# Patient Record
Sex: FEMALE | Race: WHITE | Employment: PART TIME | URBAN - NONMETROPOLITAN AREA
[De-identification: names, ages, dates, MRNs, and addresses within clinical notes are randomized per-mention and may not be internally consistent; named-entity substitution may affect disease eponyms.]

---

## 2020-03-07 ENCOUNTER — HOSPITAL ENCOUNTER (EMERGENCY)
Age: 25
Discharge: HOME | End: 2020-03-07
Payer: COMMERCIAL

## 2020-03-07 ENCOUNTER — APPOINTMENT (OUTPATIENT)
Dept: GENERAL RADIOLOGY | Age: 25
End: 2020-03-07
Payer: COMMERCIAL

## 2020-03-07 PROCEDURE — 99281 EMR DPT VST MAYX REQ PHY/QHP: CPT

## 2020-03-07 PROCEDURE — 99282 EMERGENCY DEPT VISIT SF MDM: CPT

## 2020-03-07 PROCEDURE — 73080 X-RAY EXAM OF ELBOW: CPT

## 2020-03-07 NOTE — ED PROVIDER NOTES
See downtime paper chart      Medardo Shi PA-C  03/07/20 [address] [facility] Burton Choi PA-C  03/07/20 [id number]

## 2022-01-20 ENCOUNTER — NURSE ONLY (OUTPATIENT)
Dept: LAB | Age: 27
End: 2022-01-20

## 2022-11-08 ENCOUNTER — NURSE ONLY (OUTPATIENT)
Dept: LAB | Age: 27
End: 2022-11-08

## 2022-11-25 LAB — CYTOLOGY THIN PREP PAP: NORMAL

## 2023-08-21 ENCOUNTER — OFFICE VISIT (OUTPATIENT)
Dept: FAMILY MEDICINE CLINIC | Age: 28
End: 2023-08-21
Payer: COMMERCIAL

## 2023-08-21 VITALS
RESPIRATION RATE: 16 BRPM | TEMPERATURE: 97.7 F | HEIGHT: 69 IN | BODY MASS INDEX: 23.88 KG/M2 | DIASTOLIC BLOOD PRESSURE: 72 MMHG | SYSTOLIC BLOOD PRESSURE: 118 MMHG | OXYGEN SATURATION: 99 % | HEART RATE: 88 BPM | WEIGHT: 161.2 LBS

## 2023-08-21 DIAGNOSIS — Z13.31 DEPRESSION SCREENING NEGATIVE: ICD-10-CM

## 2023-08-21 DIAGNOSIS — L73.9 FOLLICULITIS: ICD-10-CM

## 2023-08-21 DIAGNOSIS — H61.23 BILATERAL IMPACTED CERUMEN: ICD-10-CM

## 2023-08-21 DIAGNOSIS — Z00.00 ENCOUNTER FOR MEDICAL EXAMINATION TO ESTABLISH CARE: Primary | ICD-10-CM

## 2023-08-21 DIAGNOSIS — B08.1 MOLLUSCUM CONTAGIOSUM: ICD-10-CM

## 2023-08-21 PROCEDURE — 69209 REMOVE IMPACTED EAR WAX UNI: CPT | Performed by: NURSE PRACTITIONER

## 2023-08-21 PROCEDURE — 99204 OFFICE O/P NEW MOD 45 MIN: CPT | Performed by: NURSE PRACTITIONER

## 2023-08-21 RX ORDER — SULFAMETHOXAZOLE AND TRIMETHOPRIM 800; 160 MG/1; MG/1
1 TABLET ORAL 2 TIMES DAILY
Qty: 20 TABLET | Refills: 0 | Status: SHIPPED | OUTPATIENT
Start: 2023-08-21 | End: 2023-08-31

## 2023-08-21 RX ORDER — PODOFILOX 5 MG/ML
SOLUTION TOPICAL
Qty: 3.5 ML | Refills: 2 | Status: SHIPPED | OUTPATIENT
Start: 2023-08-21 | End: 2023-08-31

## 2023-08-21 RX ORDER — NORETHINDRONE ACETATE AND ETHINYL ESTRADIOL AND FERROUS FUMARATE 1.5-30(21)
1 KIT ORAL DAILY
COMMUNITY
Start: 2023-07-30

## 2023-08-21 SDOH — ECONOMIC STABILITY: HOUSING INSECURITY
IN THE LAST 12 MONTHS, WAS THERE A TIME WHEN YOU DID NOT HAVE A STEADY PLACE TO SLEEP OR SLEPT IN A SHELTER (INCLUDING NOW)?: NO

## 2023-08-21 SDOH — ECONOMIC STABILITY: FOOD INSECURITY: WITHIN THE PAST 12 MONTHS, THE FOOD YOU BOUGHT JUST DIDN'T LAST AND YOU DIDN'T HAVE MONEY TO GET MORE.: NEVER TRUE

## 2023-08-21 SDOH — ECONOMIC STABILITY: FOOD INSECURITY: WITHIN THE PAST 12 MONTHS, YOU WORRIED THAT YOUR FOOD WOULD RUN OUT BEFORE YOU GOT MONEY TO BUY MORE.: NEVER TRUE

## 2023-08-21 SDOH — ECONOMIC STABILITY: INCOME INSECURITY: HOW HARD IS IT FOR YOU TO PAY FOR THE VERY BASICS LIKE FOOD, HOUSING, MEDICAL CARE, AND HEATING?: NOT HARD AT ALL

## 2023-08-21 ASSESSMENT — ENCOUNTER SYMPTOMS
RESPIRATORY NEGATIVE: 1
COLOR CHANGE: 1
GASTROINTESTINAL NEGATIVE: 1

## 2023-08-21 ASSESSMENT — PATIENT HEALTH QUESTIONNAIRE - PHQ9
2. FEELING DOWN, DEPRESSED OR HOPELESS: 0
1. LITTLE INTEREST OR PLEASURE IN DOING THINGS: 0
SUM OF ALL RESPONSES TO PHQ QUESTIONS 1-9: 0
SUM OF ALL RESPONSES TO PHQ9 QUESTIONS 1 & 2: 0

## 2023-08-22 ENCOUNTER — NURSE ONLY (OUTPATIENT)
Dept: LAB | Age: 28
End: 2023-08-22

## 2023-08-22 DIAGNOSIS — Z00.00 ENCOUNTER FOR MEDICAL EXAMINATION TO ESTABLISH CARE: ICD-10-CM

## 2023-08-22 DIAGNOSIS — Z13.31 DEPRESSION SCREENING NEGATIVE: ICD-10-CM

## 2023-08-22 DIAGNOSIS — L73.9 FOLLICULITIS: ICD-10-CM

## 2023-08-22 DIAGNOSIS — B08.1 MOLLUSCUM CONTAGIOSUM: ICD-10-CM

## 2023-08-22 LAB
ANION GAP SERPL CALC-SCNC: 9 MEQ/L (ref 8–16)
BASOPHILS ABSOLUTE: 0 THOU/MM3 (ref 0–0.1)
BASOPHILS NFR BLD AUTO: 0.9 %
BUN SERPL-MCNC: 10 MG/DL (ref 7–22)
CALCIUM SERPL-MCNC: 9.5 MG/DL (ref 8.5–10.5)
CHLORIDE SERPL-SCNC: 105 MEQ/L (ref 98–111)
CO2 SERPL-SCNC: 25 MEQ/L (ref 23–33)
CREAT SERPL-MCNC: 0.7 MG/DL (ref 0.4–1.2)
DEPRECATED RDW RBC AUTO: 41.1 FL (ref 35–45)
EOSINOPHIL NFR BLD AUTO: 1.1 %
EOSINOPHILS ABSOLUTE: 0 THOU/MM3 (ref 0–0.4)
ERYTHROCYTE [DISTWIDTH] IN BLOOD BY AUTOMATED COUNT: 12.4 % (ref 11.5–14.5)
GFR SERPL CREATININE-BSD FRML MDRD: > 60 ML/MIN/1.73M2
GLUCOSE SERPL-MCNC: 89 MG/DL (ref 70–108)
HCT VFR BLD AUTO: 44.6 % (ref 37–47)
HGB BLD-MCNC: 14.6 GM/DL (ref 12–16)
IMM GRANULOCYTES # BLD AUTO: 0.01 THOU/MM3 (ref 0–0.07)
IMM GRANULOCYTES NFR BLD AUTO: 0.2 %
LYMPHOCYTES ABSOLUTE: 1.6 THOU/MM3 (ref 1–4.8)
LYMPHOCYTES NFR BLD AUTO: 35.7 %
MCH RBC QN AUTO: 29.7 PG (ref 26–33)
MCHC RBC AUTO-ENTMCNC: 32.7 GM/DL (ref 32.2–35.5)
MCV RBC AUTO: 90.7 FL (ref 81–99)
MONOCYTES ABSOLUTE: 0.3 THOU/MM3 (ref 0.4–1.3)
MONOCYTES NFR BLD AUTO: 7.4 %
NEUTROPHILS NFR BLD AUTO: 54.7 %
NRBC BLD AUTO-RTO: 0 /100 WBC
PLATELET # BLD AUTO: 234 THOU/MM3 (ref 130–400)
PMV BLD AUTO: 11.1 FL (ref 9.4–12.4)
POTASSIUM SERPL-SCNC: 4 MEQ/L (ref 3.5–5.2)
RBC # BLD AUTO: 4.92 MILL/MM3 (ref 4.2–5.4)
SEGMENTED NEUTROPHILS ABSOLUTE COUNT: 2.4 THOU/MM3 (ref 1.8–7.7)
SODIUM SERPL-SCNC: 139 MEQ/L (ref 135–145)
TSH SERPL DL<=0.005 MIU/L-ACNC: 1.83 UIU/ML (ref 0.4–4.2)
WBC # BLD AUTO: 4.4 THOU/MM3 (ref 4.8–10.8)

## 2023-08-23 ENCOUNTER — TELEPHONE (OUTPATIENT)
Dept: FAMILY MEDICINE CLINIC | Age: 28
End: 2023-08-23

## 2023-08-23 NOTE — TELEPHONE ENCOUNTER
Called patient and informed of lab results, patient verbalized understanding. No questions or concerns at this time.

## 2023-08-23 NOTE — TELEPHONE ENCOUNTER
----- Message from JOSE Parish CNP sent at 8/22/2023  4:58 PM EDT -----  Let pt know her labs look good, her white count is a little low at 4.4 normal range is 4.8-10.8, it could be a little low due to a recent viral infection, the rest of the CBC Is normal so not concerned with the white count. Her tsh is normal. So no concerning findings,   lket me know if she has any questions.

## 2023-11-02 ENCOUNTER — OFFICE VISIT (OUTPATIENT)
Dept: FAMILY MEDICINE CLINIC | Age: 28
End: 2023-11-02
Payer: COMMERCIAL

## 2023-11-02 VITALS
RESPIRATION RATE: 14 BRPM | TEMPERATURE: 97.9 F | BODY MASS INDEX: 24.79 KG/M2 | HEART RATE: 88 BPM | SYSTOLIC BLOOD PRESSURE: 114 MMHG | WEIGHT: 167.4 LBS | DIASTOLIC BLOOD PRESSURE: 80 MMHG | OXYGEN SATURATION: 98 % | HEIGHT: 69 IN

## 2023-11-02 DIAGNOSIS — R09.81 HEAD CONGESTION: ICD-10-CM

## 2023-11-02 DIAGNOSIS — H65.01 NON-RECURRENT ACUTE SEROUS OTITIS MEDIA OF RIGHT EAR: ICD-10-CM

## 2023-11-02 DIAGNOSIS — H91.93 BILATERAL HEARING LOSS, UNSPECIFIED HEARING LOSS TYPE: ICD-10-CM

## 2023-11-02 DIAGNOSIS — H61.23 BILATERAL IMPACTED CERUMEN: ICD-10-CM

## 2023-11-02 DIAGNOSIS — H92.01 ACUTE OTALGIA, RIGHT: Primary | ICD-10-CM

## 2023-11-02 LAB
Lab: 1234
QC PASS/FAIL: NORMAL
SARS-COV-2 RDRP RESP QL NAA+PROBE: NEGATIVE

## 2023-11-02 PROCEDURE — 99214 OFFICE O/P EST MOD 30 MIN: CPT | Performed by: NURSE PRACTITIONER

## 2023-11-02 PROCEDURE — 87635 SARS-COV-2 COVID-19 AMP PRB: CPT | Performed by: NURSE PRACTITIONER

## 2023-11-02 RX ORDER — AMOXICILLIN 500 MG/1
500 CAPSULE ORAL 3 TIMES DAILY
Qty: 30 CAPSULE | Refills: 0 | Status: SHIPPED | OUTPATIENT
Start: 2023-11-02 | End: 2023-11-12

## 2023-11-02 NOTE — PROGRESS NOTES
SUBJECTIVE:  Marc Saleh is a 25 y.o. y/o female that presents with Otalgia (Tuesday night right ear. Left ear feels clogged. Pt thinks she has an ear infection)  . HPI:      Symptoms have been present for 3 day(s). Symptoms are unchanged since they initially started. Fever? No  Runny nose or congestion? Yes   Cough? Yes  Sore throat? No  Headache, fatigue, joint pains, muscle aches? No  Shortness of breath/Wheezing? No  Nausea/Vomiting/Diarrhea? No  Double Sickening? No  Sick contacts? Mother was sick last week, having right ear pain severe took nyquil helped a little bit no tylenol or motrin  Known COVID exposures of RFs? No  Smoker? No  Preexisting Respiratory conditions? No    Patient has tried nyquil and dayquil with out improvement. Ear Complaint    HPI:  Symptoms have been present for 3 day(s). Inciting incident or history of trauma? no  Decreased hearing? Yes  Ear tenderness? No  Ear drainage? No  Feeling of fullness? Yes  Radiation of the pain? No  Dizziness or pre-syncope? No  Affected by position or head movment? No  Sore throat, rhinorrhea or sinus congestion? Yes  Hx of Bruxism? No  Treatment tried and response - as above      History reviewed. No pertinent past medical history.     Social History     Socioeconomic History    Marital status: Single     Spouse name: Not on file    Number of children: Not on file    Years of education: Not on file    Highest education level: Not on file   Occupational History    Not on file   Tobacco Use    Smoking status: Never    Smokeless tobacco: Never   Substance and Sexual Activity    Alcohol use: No    Drug use: No    Sexual activity: Not on file   Other Topics Concern    Not on file   Social History Narrative    Not on file     Social Determinants of Health     Financial Resource Strain: Low Risk  (8/21/2023)    Overall Financial Resource Strain (CARDIA)     Difficulty of Paying Living Expenses: Not hard at all   Food

## 2023-11-09 ENCOUNTER — NURSE ONLY (OUTPATIENT)
Dept: LAB | Age: 28
End: 2023-11-09

## 2023-11-20 ENCOUNTER — OFFICE VISIT (OUTPATIENT)
Dept: ENT CLINIC | Age: 28
End: 2023-11-20
Payer: COMMERCIAL

## 2023-11-20 VITALS
BODY MASS INDEX: 24.57 KG/M2 | HEART RATE: 91 BPM | OXYGEN SATURATION: 99 % | WEIGHT: 165.9 LBS | TEMPERATURE: 97.6 F | SYSTOLIC BLOOD PRESSURE: 120 MMHG | HEIGHT: 69 IN | DIASTOLIC BLOOD PRESSURE: 78 MMHG | RESPIRATION RATE: 18 BRPM

## 2023-11-20 DIAGNOSIS — H61.23 HEARING LOSS OF BOTH EARS DUE TO CERUMEN IMPACTION: Primary | ICD-10-CM

## 2023-11-20 LAB — CYTOLOGY THIN PREP PAP: NORMAL

## 2023-11-20 PROCEDURE — 69210 REMOVE IMPACTED EAR WAX UNI: CPT | Performed by: PHYSICIAN ASSISTANT

## 2023-11-20 PROCEDURE — 99202 OFFICE O/P NEW SF 15 MIN: CPT | Performed by: PHYSICIAN ASSISTANT

## 2023-11-20 NOTE — PROGRESS NOTES
with exam.   Cranial nerve II-XII grossly intact. Skin:  Skin is warm. No erythema. Psychiatric:  Normal mood and affect. Behavior is normal.     Data:    All of the past medical history, past surgical history, family history, social history, allergies and current medications were reviewed. This includes notes from referring provider(s) and associated labs/imaging. Assessment/Plan:      ICD-10-CM    1. Hearing loss of both ears due to cerumen impaction  H61.23            Extensive bilateral cerumen impactions removed as described above. She reports immediate improvement in her hearing bilaterally with cerumen removal. She overall tolerated the procedure well without immediate evidence of complication  No visible middle ear pathology noted  Follow up PRN for muffled hearing/recurrence of cerumen impactions or other ENT concerns. She expresses understanding of the plan and thanked me.     (Please note that portions of this note may have been completed with a voice recognition program.  Efforts were made to edit the dictation but occasionally words are mis-transcribed.)    Electronically signed by CIARA Jones on 11/20/2023 at 8:51 AM

## 2024-01-14 ENCOUNTER — HOSPITAL ENCOUNTER (EMERGENCY)
Age: 29
Discharge: HOME | End: 2024-01-14
Payer: COMMERCIAL

## 2024-01-14 VITALS
TEMPERATURE: 101 F | SYSTOLIC BLOOD PRESSURE: 122 MMHG | OXYGEN SATURATION: 98 % | HEART RATE: 110 BPM | DIASTOLIC BLOOD PRESSURE: 84 MMHG | RESPIRATION RATE: 16 BRPM

## 2024-01-14 DIAGNOSIS — J10.1 INFLUENZA B: Primary | ICD-10-CM

## 2024-01-14 LAB
FLUAV AG SPEC QL: NEGATIVE
FLUBV AG SPEC QL: POSITIVE

## 2024-01-14 PROCEDURE — 99203 OFFICE O/P NEW LOW 30 MIN: CPT | Performed by: NURSE PRACTITIONER

## 2024-01-14 PROCEDURE — 87804 INFLUENZA ASSAY W/OPTIC: CPT

## 2024-01-14 PROCEDURE — 99203 OFFICE O/P NEW LOW 30 MIN: CPT

## 2024-01-14 RX ORDER — OSELTAMIVIR PHOSPHATE 75 MG/1
75 CAPSULE ORAL 2 TIMES DAILY
Qty: 10 CAPSULE | Refills: 0 | Status: SHIPPED | OUTPATIENT
Start: 2024-01-14 | End: 2024-01-19

## 2024-01-14 ASSESSMENT — PAIN SCALES - GENERAL: PAINLEVEL_OUTOF10: 5

## 2024-01-14 ASSESSMENT — PAIN - FUNCTIONAL ASSESSMENT: PAIN_FUNCTIONAL_ASSESSMENT: 0-10

## 2024-01-14 ASSESSMENT — PAIN DESCRIPTION - LOCATION: LOCATION: THROAT

## 2024-01-14 NOTE — DISCHARGE INSTR - COC
Continuity of Care Form    Patient Name: Cheyenne Garcia   :  1995  MRN:  [id number]    Admit date:  2024  Discharge date:  ***    Code Status Order: No Order   Advance Directives:     Admitting Physician:  No admitting provider for patient encounter.  PCP: Eric Yo APRN - CNP    Discharging Nurse: ***  Discharging Hospital Unit/Room#:   Discharging Unit Phone Number: ***    Emergency Contact:   Extended Emergency Contact Information  Primary Emergency Contact: Laverne Garcia  Address: [address] [place] [address]  Home Phone: [phone]  Relation: Parent    Past Surgical History:  Past Surgical History:   Procedure Laterality Date    MANDIBLE RECONSTRUCTION         Immunization History:   Immunization History   Administered Date(s) Administered    TDaP, ADACEL (age 10y-64y), BOOSTRIX (age 10y+), IM, 0.5mL 2020    Td, unspecified formulation 2012       Active Problems:  There is no problem list on file for this patient.      Isolation/Infection:   Isolation            No Isolation          Patient Infection Status       Infection Onset Added Last Indicated Last Indicated By Review Planned Expiration Resolved Resolved By    Influenza 24 Rapid influenza A/B antigens 24              Nurse Assessment:  Last Vital Signs: /84   Pulse (!) 110   Temp (!) 101 °F (38.3 °C) (Oral)   Resp 16   LMP 2024 (Approximate)   SpO2 98%     Last documented pain score (0-10 scale): Pain Level: 5  Last Weight:   Wt Readings from Last 1 Encounters:   23 75.3 kg (165 lb 14.4 oz)     Mental Status:  {IP PT MENTAL STATUS:}    IV Access:  { KEYLA IV ACCESS:894973918}    Nursing Mobility/ADLs:  Walking   {CHP DME ADLs:003013610}  Transfer  {CHP DME ADLs:506185099}  Bathing  {CHP DME ADLs:937005825}  Dressing  {CHP DME ADLs:883684351}  Toileting  {CHP DME ADLs:257440922}  Feeding  {CHP DME

## 2024-01-29 ENCOUNTER — NURSE ONLY (OUTPATIENT)
Dept: LAB | Age: 29
End: 2024-01-29

## 2024-03-05 ENCOUNTER — OFFICE VISIT (OUTPATIENT)
Dept: FAMILY MEDICINE CLINIC | Age: 29
End: 2024-03-05
Payer: COMMERCIAL

## 2024-03-05 VITALS
DIASTOLIC BLOOD PRESSURE: 72 MMHG | SYSTOLIC BLOOD PRESSURE: 126 MMHG | RESPIRATION RATE: 16 BRPM | BODY MASS INDEX: 25.92 KG/M2 | HEIGHT: 69 IN | WEIGHT: 175 LBS | OXYGEN SATURATION: 99 % | TEMPERATURE: 98.4 F | HEART RATE: 74 BPM

## 2024-03-05 DIAGNOSIS — Z51.81 MEDICATION MONITORING ENCOUNTER: ICD-10-CM

## 2024-03-05 DIAGNOSIS — B35.1 ONYCHOMYCOSIS: Primary | ICD-10-CM

## 2024-03-05 PROCEDURE — 99213 OFFICE O/P EST LOW 20 MIN: CPT | Performed by: NURSE PRACTITIONER

## 2024-03-05 RX ORDER — TERBINAFINE HYDROCHLORIDE 250 MG/1
250 TABLET ORAL DAILY
Qty: 42 TABLET | Refills: 0 | Status: SHIPPED | OUTPATIENT
Start: 2024-03-05 | End: 2024-04-16

## 2024-03-05 ASSESSMENT — PATIENT HEALTH QUESTIONNAIRE - PHQ9
SUM OF ALL RESPONSES TO PHQ9 QUESTIONS 1 & 2: 0
SUM OF ALL RESPONSES TO PHQ QUESTIONS 1-9: 0
SUM OF ALL RESPONSES TO PHQ QUESTIONS 1-9: 0
1. LITTLE INTEREST OR PLEASURE IN DOING THINGS: NOT AT ALL
SUM OF ALL RESPONSES TO PHQ QUESTIONS 1-9: 0
2. FEELING DOWN, DEPRESSED OR HOPELESS: NOT AT ALL
SUM OF ALL RESPONSES TO PHQ QUESTIONS 1-9: 0
2. FEELING DOWN, DEPRESSED OR HOPELESS: 0
SUM OF ALL RESPONSES TO PHQ9 QUESTIONS 1 & 2: 0
1. LITTLE INTEREST OR PLEASURE IN DOING THINGS: 0

## 2024-03-05 ASSESSMENT — ENCOUNTER SYMPTOMS
RESPIRATORY NEGATIVE: 1
COLOR CHANGE: 1

## 2024-03-05 NOTE — PROGRESS NOTES
allergies, medications, social and family history and I have made updates where appropriate.  Subjective:      Review of Systems   Respiratory: Negative.     Cardiovascular: Negative.    Skin:  Positive for color change.       Objective:     /72   Pulse 74   Temp 98.4 °F (36.9 °C)   Resp 16   Ht 1.753 m (5' 9\")   Wt 79.4 kg (175 lb)   LMP 03/04/2024 (Exact Date)   SpO2 99%   BMI 25.84 kg/m²     Physical Exam  Vitals and nursing note reviewed.   Constitutional:       Appearance: Normal appearance.   Cardiovascular:      Rate and Rhythm: Normal rate and regular rhythm.      Pulses: Normal pulses.      Heart sounds: Normal heart sounds. No murmur heard.  Feet:      Comments: Bilateral great toenails thickened, yellowed and ridged and brittle  No erythema of either foot or at base of toenail.     BLE PP equal and strong    All other toenails normal think and not ridged,   Skin:     General: Skin is warm and dry.   Neurological:      Mental Status: She is alert.          /Plan:          1. Onychomycosis  Have hepatic panel now to check liver enzymes a lamisil can affect the LFT's  [Pt in agreement with plan   - Hepatic Function Panel; Future  - terbinafine (LAMISIL) 250 MG tablet; Take 1 tablet by mouth daily  Dispense: 42 tablet; Refill: 0    2. Medication monitoring encounter    - Hepatic Function Panel; Future  Pt in agreement with plan     Return in about 6 weeks (around 4/16/2024) for f/u onchymycosis.    Reccommended tobaccocessation options including pharmacologic methods, counseled great than 3 minutesduring this visit:  Yes[]  No  []       Patient given educational materials -see patient instructions.  Discussed use, benefit, and side effects of prescribedmedications.  All patient questions answered.  Pt voiced understanding. Reviewedhealth maintenance.  Instructed to continue current medications, diet and exercise.Patient agreed with treatment plan. Follow up as directed.

## 2024-09-08 ENCOUNTER — HOSPITAL ENCOUNTER (EMERGENCY)
Age: 29
Discharge: HOME | End: 2024-09-08
Payer: COMMERCIAL

## 2024-09-08 VITALS
HEIGHT: 71 IN | WEIGHT: 170 LBS | DIASTOLIC BLOOD PRESSURE: 85 MMHG | TEMPERATURE: 97.5 F | BODY MASS INDEX: 23.8 KG/M2 | SYSTOLIC BLOOD PRESSURE: 135 MMHG | HEART RATE: 108 BPM | OXYGEN SATURATION: 98 % | RESPIRATION RATE: 18 BRPM

## 2024-09-08 DIAGNOSIS — H10.31 ACUTE CONJUNCTIVITIS OF RIGHT EYE, UNSPECIFIED ACUTE CONJUNCTIVITIS TYPE: Primary | ICD-10-CM

## 2024-09-08 PROCEDURE — 99213 OFFICE O/P EST LOW 20 MIN: CPT

## 2024-09-08 RX ORDER — POLYMYXIN B SULFATE AND TRIMETHOPRIM 1; 10000 MG/ML; [USP'U]/ML
1 SOLUTION OPHTHALMIC EVERY 4 HOURS
Qty: 3 ML | Refills: 0 | Status: SHIPPED | OUTPATIENT
Start: 2024-09-08 | End: 2024-09-18

## 2024-09-08 ASSESSMENT — PAIN SCALES - GENERAL: PAINLEVEL_OUTOF10: 4

## 2024-09-08 ASSESSMENT — PAIN DESCRIPTION - DESCRIPTORS: DESCRIPTORS: SORE

## 2024-09-08 ASSESSMENT — PAIN DESCRIPTION - LOCATION: LOCATION: EYE

## 2024-09-08 ASSESSMENT — PAIN - FUNCTIONAL ASSESSMENT: PAIN_FUNCTIONAL_ASSESSMENT: 0-10

## 2024-09-08 ASSESSMENT — PAIN DESCRIPTION - ORIENTATION: ORIENTATION: RIGHT

## 2025-01-03 ENCOUNTER — LAB (OUTPATIENT)
Dept: LAB | Age: 30
End: 2025-01-03

## 2025-01-14 LAB — CYTOLOGY THIN PREP PAP: NORMAL
